# Patient Record
Sex: MALE | Race: WHITE | Employment: FULL TIME | URBAN - METROPOLITAN AREA
[De-identification: names, ages, dates, MRNs, and addresses within clinical notes are randomized per-mention and may not be internally consistent; named-entity substitution may affect disease eponyms.]

---

## 2018-12-07 ENCOUNTER — APPOINTMENT (OUTPATIENT)
Dept: GENERAL RADIOLOGY | Age: 43
End: 2018-12-07
Attending: NURSE PRACTITIONER
Payer: COMMERCIAL

## 2018-12-07 ENCOUNTER — HOSPITAL ENCOUNTER (EMERGENCY)
Age: 43
Discharge: HOME | End: 2018-12-07
Attending: EMERGENCY MEDICINE
Payer: COMMERCIAL

## 2018-12-07 VITALS
HEIGHT: 74 IN | WEIGHT: 315 LBS | DIASTOLIC BLOOD PRESSURE: 62 MMHG | BODY MASS INDEX: 40.43 KG/M2 | HEART RATE: 95 BPM | SYSTOLIC BLOOD PRESSURE: 121 MMHG | OXYGEN SATURATION: 99 % | RESPIRATION RATE: 16 BRPM | TEMPERATURE: 98.6 F

## 2018-12-07 DIAGNOSIS — M79.672 LEFT FOOT PAIN: ICD-10-CM

## 2018-12-07 DIAGNOSIS — M25.462 EFFUSION OF LEFT KNEE: Primary | ICD-10-CM

## 2018-12-07 PROCEDURE — 74011250636 HC RX REV CODE- 250/636: Performed by: NURSE PRACTITIONER

## 2018-12-07 PROCEDURE — 96372 THER/PROPH/DIAG INJ SC/IM: CPT

## 2018-12-07 PROCEDURE — 93971 EXTREMITY STUDY: CPT

## 2018-12-07 PROCEDURE — 73562 X-RAY EXAM OF KNEE 3: CPT

## 2018-12-07 PROCEDURE — 99283 EMERGENCY DEPT VISIT LOW MDM: CPT

## 2018-12-07 RX ORDER — ONDANSETRON 4 MG/1
4 TABLET, ORALLY DISINTEGRATING ORAL
Status: DISCONTINUED | OUTPATIENT
Start: 2018-12-07 | End: 2018-12-08 | Stop reason: HOSPADM

## 2018-12-07 RX ORDER — METHOCARBAMOL 750 MG/1
750 TABLET, FILM COATED ORAL 4 TIMES DAILY
Qty: 20 TAB | Refills: 0 | Status: SHIPPED | OUTPATIENT
Start: 2018-12-07

## 2018-12-07 RX ORDER — KETOROLAC TROMETHAMINE 10 MG/1
10 TABLET, FILM COATED ORAL
Qty: 20 TAB | Refills: 0 | Status: SHIPPED | OUTPATIENT
Start: 2018-12-07 | End: 2018-12-12

## 2018-12-07 RX ORDER — KETOROLAC TROMETHAMINE 30 MG/ML
30 INJECTION, SOLUTION INTRAMUSCULAR; INTRAVENOUS
Status: COMPLETED | OUTPATIENT
Start: 2018-12-07 | End: 2018-12-07

## 2018-12-07 RX ADMIN — KETOROLAC TROMETHAMINE 30 MG: 30 INJECTION, SOLUTION INTRAMUSCULAR at 19:34

## 2018-12-08 NOTE — ED NOTES
MD reviewed discharge instructions and options with patient and patient verbalized understanding. RN reviewed discharge instructions using teachback method. Pt ambulated to exit without difficulty and in no signs of acute distress escorted by wife, and she  will drive home. No complaints or needs expressed at this time. Patient was counseled on medications prescribed at discharge. VSS, verbalized relief from most intense pain. Patient to call Dr. Cici Vasquez in the morning for appointment.

## 2018-12-08 NOTE — PROCEDURES
[address]  *** FINAL REPORT ***    Name: Benito Piper  MRN: [id number]  : 15 Dec 1975  HIS Order #: [id number]  [address] [facility] Visit #: [id number]  Date: 07 Dec 2018    TYPE OF TEST: Peripheral Venous Testing    REASON FOR TEST  Limb swelling    Left Leg:-  Deep venous thrombosis:           No  Superficial venous thrombosis:    No  Deep venous insufficiency:        No  Superficial venous insufficiency: No      INTERPRETATION/FINDINGS  PROCEDURE:  Color duplex ultrasound imaging of lower extremity veins. FINDINGS:       Right: The common femoral vein is patent and without evidence of  thrombus. Phasic flow is observed. This extremity was not otherwise  evaluated. Left:   The common femoral, deep femoral, femoral, popliteal,  posterior tibial, peroneal, and great saphenous are patent and without   evidence of thrombus;  each is fully compressible and there is no  narrowing of the flow channel on color Doppler imaging. Phasic flow  is observed in the common femoral vein. Large complex fluid filled structure noted superior to patella. This  pocket of fluid wraps around knee joint and is seen with areas of  echogenicity. IMPRESSION:  No evidence of left lower extremity vein thrombosis. Large complex fluid filled strucutre noted around knee joint. ADDITIONAL COMMENTS    I have personally reviewed the data relevant to the interpretation of  this  study. TECHNOLOGIST: Tobias Grigsby RVT  Signed: 2018 08:12 PM    PHYSICIAN: Suzy Garcia MD  Signed: 2018 09:19 AM

## 2018-12-08 NOTE — DISCHARGE INSTRUCTIONS
Thank you for allowing us to care for you today. Please follow-up with your Primary Care provider in the next 2-3 days if your symptoms do not improve. Plan for home:       Toradol 10mg every 6-8 hours for pain. Use on a schedule for the next 3 days. Follow-up with Orthopedics. You may pick one you have seen before. There is a recommendation listed below. Knee brace when walking. Use crutches for support. RICE therapy:  \"R\" is for rest. Please rest the injured area  \"I\" is for ice. Please ice the area and 20 minute increments multiple times daily. Make sure you have at least 20 minutes between each ice application. Do not place ice directly on the skin as it can cause frostbite. \"C\" is for compression. You may use a light Ace wrap to compress the area to help the swelling. Make sure you have no numbness and tingling past the Ace wrap. \"E\" is for elevate. Keep the swollen area elevated as much as possible. Elevate above the heart whenever possible. Come back to the ER if you have worsening symptoms, fevers over 100.9, shaking chills, nausea or vomiting. Knee Pain or Injury: Care Instructions  Your Care Instructions    Injuries are a common cause of knee problems. Sudden (acute) injuries may be caused by a direct blow to the knee. They can also be caused by abnormal twisting, bending, or falling on the knee. Pain, bruising, or swelling may be severe, and may start within minutes of the injury. Overuse is another cause of knee pain. Other causes are climbing stairs, kneeling, and other activities that use the knee. Everyday wear and tear, especially as you get older, also can cause knee pain. Rest, along with home treatment, often relieves pain and allows your knee to heal. If you have a serious knee injury, you may need tests and treatment. Follow-up care is a key part of your treatment and safety.  Be sure to make and go to all appointments, and call your doctor if you are having problems. It's also a good idea to know your test results and keep a list of the medicines you take. How can you care for yourself at home? · Be safe with medicines. Read and follow all instructions on the label. ? If the doctor gave you a prescription medicine for pain, take it as prescribed. ? If you are not taking a prescription pain medicine, ask your doctor if you can take an over-the-counter medicine. · Rest and protect your knee. Take a break from any activity that may cause pain. · Put ice or a cold pack on your knee for 10 to 20 minutes at a time. Put a thin cloth between the ice and your skin. · Prop up a sore knee on a pillow when you ice it or anytime you sit or lie down for the next 3 days. Try to keep it above the level of your heart. This will help reduce swelling. · If your knee is not swollen, you can put moist heat, a heating pad, or a warm cloth on your knee. · If your doctor recommends an elastic bandage, sleeve, or other type of support for your knee, wear it as directed. · Follow your doctor's instructions about how much weight you can put on your leg. Use a cane, crutches, or a walker as instructed. · Follow your doctor's instructions about activity during your healing process. If you can do mild exercise, slowly increase your activity. · Reach and stay at a healthy weight. Extra weight can strain the joints, especially the knees and hips, and make the pain worse. Losing even a few pounds may help. When should you call for help? Call 911 anytime you think you may need emergency care. For example, call if:    · You have symptoms of a blood clot in your lung (called a pulmonary embolism). These may include:  ? Sudden chest pain. ? Trouble breathing. ?  Coughing up blood.    Call your doctor now or seek immediate medical care if:    · You have severe or increasing pain.     · Your leg or foot turns cold or changes color.     · You cannot stand or put weight on your knee.     · Your knee looks twisted or bent out of shape.     · You cannot move your knee.     · You have signs of infection, such as:  ? Increased pain, swelling, warmth, or redness. ? Red streaks leading from the knee. ? Pus draining from a place on your knee. ? A fever.     · You have signs of a blood clot in your leg (called a deep vein thrombosis), such as:  ? Pain in your calf, back of the knee, thigh, or groin. ? Redness and swelling in your leg or groin.    Watch closely for changes in your health, and be sure to contact your doctor if:    · You have tingling, weakness, or numbness in your knee.     · You have any new symptoms, such as swelling.     · You have bruises from a knee injury that last longer than 2 weeks.     · You do not get better as expected. Where can you learn more? Go to http://coco-naders.info/. Enter K195 in the search box to learn more about \"Knee Pain or Injury: Care Instructions. \"  Current as of: November 20, 2017  Content Version: 11.8  © 4789-5657 [employer]. Care instructions adapted under license by [employer] (which disclaims liability or warranty for this information). If you have questions about a medical condition or this instruction, always ask your healthcare professional. [address] any warranty or liability for your use of this information.

## 2018-12-08 NOTE — ED TRIAGE NOTES
Patient arrives for LEFT knee pain since Monday. Reports it was worse yesterday and not relieved by NSAIDS. Patient states pain has moved into this foot as well. Able to move all extremities.

## 2018-12-08 NOTE — ED PROVIDER NOTES
40 y.o. male with past medical history significant for left knee surgery presents accompanied by wife with chief complaint of left knee pain and stiffness, 9/10 in severity, onset approximately 4-5 days ago and progressively worsening. Pt reports that he started with just left knee pain before it progressed to stiffness yesterday, noting decreased range of motion. He explains that the pain then radiated down to his LLE, including his feet and toes. Pt further explains that he is having difficulty walking secondary to pain, denying any relief from NSAIDS that he has taken. Pt indicates that he has had left knee problems in the past that was corrected with surgery; he denies any issues until recently. Pt denies h/o gout. Pt denies any recent travels. Pt denies any cough, chest pain, or shortness of breath currently. There are no other acute medical concerns at this time. Social hx: Patient denies Tobacco use. Reports occasional EtOH use. Denies illicit drug abuse. PCP: Nicky Ruvalcaba MD    Note written by Stanley Britt, as dictated by Natalio Mederos NP,  7:18 PM        The history is provided by the patient. No  was used. History reviewed. No pertinent past medical history. Past Surgical History:   Procedure Laterality Date    HX ORTHOPAEDIC      knee and foot         History reviewed. No pertinent family history.     Social History     Socioeconomic History    Marital status: SINGLE     Spouse name: Not on file    Number of children: Not on file    Years of education: Not on file    Highest education level: Not on file   Social Needs    Financial resource strain: Not on file    Food insecurity - worry: Not on file    Food insecurity - inability: Not on file    Transportation needs - medical: Not on file   [employer] needs - non-medical: Not on file   Occupational History    Not on file   Tobacco Use    Smoking status: Never Smoker    Smokeless tobacco: Current User   Substance and Sexual Activity    Alcohol use: Yes     Comment: socially    Drug use: No    Sexual activity: Not on file   Other Topics Concern    Not on file   Social History Narrative    Not on file         ALLERGIES: Patient has no known allergies. Review of Systems   Constitutional: Negative for appetite change, chills and fever. HENT: Negative. Respiratory: Negative for cough, shortness of breath and wheezing. Cardiovascular: Negative for chest pain. Gastrointestinal: Negative for abdominal pain, constipation, diarrhea, nausea and vomiting. Genitourinary: Negative for dysuria and urgency. Musculoskeletal: Positive for arthralgias (left knee, foot and toes), gait problem (secondary to pain), joint swelling (left knee) and myalgias (LLE). Negative for back pain. Skin: Negative for color change and rash. Neurological: Negative for dizziness and headaches. Psychiatric/Behavioral: Negative. All other systems reviewed and are negative. Vitals:    12/07/18 1809   SpO2: 97%            Physical Exam   Constitutional: He is oriented to person, place, and time. He appears well-developed and well-nourished. HENT:   Head: Normocephalic and atraumatic. Cardiovascular: Regular rhythm and intact distal pulses. Pulmonary/Chest: Effort normal and breath sounds normal. No stridor. Abdominal: Normal appearance. Musculoskeletal:        Left knee: He exhibits decreased range of motion, swelling, effusion and bony tenderness. He exhibits no ecchymosis, no deformity, no laceration, no erythema, normal alignment, no LCL laxity, normal patellar mobility, normal meniscus and no MCL laxity. Tenderness found. Legs:  Large swollen area below the patella over the Tibia area. Swelling behind the knee. Swollen calf as well. Pain over plantar fascia of foot. Neurological: He is alert and oriented to person, place, and time. Skin: Skin is warm.    Psychiatric: He has a normal mood and affect. His behavior is normal. Judgment and thought content normal.                MDM       Procedures    Assessment & Plan:     Orders Placed This Encounter    APPLY KNEE IMMOBILIZER    XR KNEE LT 3 V    DUPLEX LOWER EXT VENOUS LEFT    CRUTCHES WITH INSTRUCTIONS    ketorolac (TORADOL) injection 30 mg    ondansetron (ZOFRAN ODT) tablet 4 mg    ketorolac (TORADOL) 10 mg tablet    methocarbamol (ROBAXIN-750) 750 mg tablet       Discussed with Kae Cade MD,ED Provider    Page SILVIANO Melchor  12/07/18  7:18 PM    Knee effusion. No DVT in the calf. Follow-up with Dr. Debbie Lindsey at [facility] (did other knee). Roradol and robaxin for home. Knee immobilizer and crutches. Discussed return precautions. 10:44 PM  The patient has been reevaluated. The patient is ready for discharge. The patient's signs, symptoms, diagnosis, and discharge instructions have been discussed and the patient/ family has conveyed their understanding. The patient is to follow up as recommended or return to the ED should their symptoms worsen. Plan has been discussed and the patient is in agreement. LABORATORY TESTS:  Labs Reviewed - No data to display    IMAGING RESULTS:  Xr Knee Lt 3 V    Result Date: 12/7/2018  EXAM:  XR KNEE LT 3 V INDICATION:   left knee pain and stiffness. COMPARISON: 6/11/2016. FINDINGS: Three views of the left knee demonstrate mild degeneration of the patellofemoral compartment. Unchanged. No fracture. Chronic fragmentation at the distal patellar insertion. There is a large joint effusion. IMPRESSION:  Large joint effusion with mild degeneration of the patellofemoral compartment. MEDICATIONS GIVEN:  Medications   ondansetron (ZOFRAN ODT) tablet 4 mg (not administered)   ketorolac (TORADOL) injection 30 mg (30 mg IntraMUSCular Given 12/7/18 1934)       IMPRESSION:  1. Effusion of left knee    2. Left foot pain        PLAN:  1.    Discharge Medication List as of 12/7/2018 10:25 PM      START taking these medications    Details   ketorolac (TORADOL) 10 mg tablet Take 1 Tab by mouth every six (6) hours as needed for Pain for up to 5 days. , Print, Disp-20 Tab, R-0      methocarbamol (ROBAXIN-750) 750 mg tablet Take 1 Tab by mouth four (4) times daily. As needed for muscle spasm. No driving while on this medication. , Print, Disp-20 Tab, R-0         CONTINUE these medications which have NOT CHANGED    Details   traMADol (ULTRAM) 50 mg tablet Take 1 Tab by mouth every six (6) hours as needed for Pain. Max Daily Amount: 200 mg., Print, Disp-12 Tab, R-0           2. Follow-up Information     Follow up With Specialties Details Why [address] [facility], [address] [place] [address]  [phone]      Kayley Goldberg MD Orthopedic Surgery Schedule an appointment as soon as possible for a visit  [address]  [address] [place] [address],[address]  [address] [address] [phone]      [address] [facility] [address] [facility]  As needed, If symptoms worsen [facility]  [phone]        3.      Return to ED for new or worsening symptoms       Page SILVIANO Melchor